# Patient Record
Sex: FEMALE | Race: WHITE | NOT HISPANIC OR LATINO | URBAN - METROPOLITAN AREA
[De-identification: names, ages, dates, MRNs, and addresses within clinical notes are randomized per-mention and may not be internally consistent; named-entity substitution may affect disease eponyms.]

---

## 2023-05-25 ENCOUNTER — EMERGENCY (EMERGENCY)
Facility: HOSPITAL | Age: 73
LOS: 1 days | Discharge: HOME | End: 2023-05-25
Attending: EMERGENCY MEDICINE | Admitting: EMERGENCY MEDICINE
Payer: MEDICARE

## 2023-05-25 VITALS
TEMPERATURE: 98 F | DIASTOLIC BLOOD PRESSURE: 56 MMHG | OXYGEN SATURATION: 94 % | RESPIRATION RATE: 16 BRPM | HEART RATE: 69 BPM | SYSTOLIC BLOOD PRESSURE: 105 MMHG

## 2023-05-25 PROCEDURE — 99284 EMERGENCY DEPT VISIT MOD MDM: CPT

## 2023-05-25 PROCEDURE — 71250 CT THORAX DX C-: CPT | Mod: 26,MH

## 2023-05-25 PROCEDURE — 71046 X-RAY EXAM CHEST 2 VIEWS: CPT | Mod: 26

## 2023-05-25 NOTE — ED PROVIDER NOTE - PHYSICAL EXAMINATION
Constitutional:  Well appearing, awake, alert, oriented to person, place, time/situation and in no apparent distress.  HEENT: Airway patent, Nasal mucosa clear. Mouth with normal mucosa.   Eyes: Clear bilaterally, pupils equal, round and reactive to light.  Cardiac: Normal rate, regular rhythm.  Respiratory: Breath sounds clear and equal bilaterally.  GI: Abdomen soft, non-tender, no guarding.  MSK: Spine appears normal, range of motion is not limited, no muscle or joint tenderness  Neuro: Alert and oriented, no focal deficits, no motor or sensory deficits.  Skin: Skin normal color for race, warm, dry and intact. No evidence of rash.

## 2023-05-25 NOTE — ED PROVIDER NOTE - PATIENT PORTAL LINK FT
You can access the FollowMyHealth Patient Portal offered by [facility] by registering at the following website: [email or web address]. By joining [facility]’s FollowMyHealth portal, you will also be able to view your health information using other applications (apps) compatible with our system.

## 2023-05-25 NOTE — ED ADULT NURSE NOTE - NSFALLUNIVINTERV_ED_ALL_ED
Bed/Stretcher in lowest position, wheels locked, appropriate side rails in place/Call bell, personal items and telephone in reach/Instruct patient to call for assistance before getting out of bed/chair/stretcher/Non-slip footwear applied when patient is off stretcher/[place] to call system/Physically safe environment - no spills, clutter or unnecessary equipment/Purposeful proactive rounding/Room/bathroom lighting operational, light cord in reach

## 2023-05-25 NOTE — ED PROVIDER NOTE - NSFOLLOWUPINSTRUCTIONS_ED_ALL_ED_FT
Pulmonary Nodule    A pulmonary nodule is a small, round growth of tissue in the lung. It is sometimes referred to as a shadow or a spot on the lung. Nodules can vary in size, and most measure less than ½ of an inch (10 mm). Nodules bigger than 1.2 inches (3 cm) are called lung masses. A pulmonary nodule is sometimes found during a routine chest X-ray or while other imaging tests are done to check for other problems.    Pulmonary nodules can be either noncancerous (benign) or cancerous (malignant). Most are noncancerous. Smaller nodules in people who do not smoke and who do not have any other risk factors for lung cancer are more likely to be noncancerous. Larger, irregular nodules in people who smoke or who have a strong family history of lung cancer are more likely to be cancerous.    What are the causes?  This condition may be caused by:  A bacterial, fungal, or viral infection, such as tuberculosis. The infection is usually an old and inactive one.  Cancerous tissue, such as lung cancer or a cancer in another part of the body that has spread to the lung.  A noncancerous mass of tissue.  Inflammation from conditions such as rheumatoid arthritis.  Abnormal blood vessels in the lungs.  What are the signs or symptoms?  Usually, there are no symptoms of this condition. If symptoms appear, they are usually related to the underlying cause. For example, if the condition is caused by an infection, you may have a cough or a fever.    How is this diagnosed?  This condition is usually diagnosed with an X-ray or CT scan. To help determine whether a pulmonary nodule is benign or malignant, your health care provider will:  Take your medical history.  Perform a physical exam.  Order tests. These may include:  Chest X-rays.  A CT scan. This test shows smaller pulmonary nodules more clearly and with more detail than an X-ray.  A positron emission tomography (PET) scan. This test is done to check if a nodule is cancerous. During the test, a small amount of a radioactive substance is injected into the bloodstream. Then a picture is taken.  Biopsy to evaluate for cancer or confirm a diagnosis. This procedure involves removing a tissue sample from the nodule by inserting a needle through the chest, placing a scope down into the lung, or doing open surgery.  A skin test called a tuberculin test. This test is done to check if you have been exposed to the germ that causes tuberculosis.  Blood tests.  How is this treated?  Treatment for this condition depends on whether the pulmonary nodule is malignant or benign. It also depends on your risk of getting cancer.    Noncancerous nodules usually do not need to be treated, but they may need to be monitored with CT scans. If a CT scan shows that the pulmonary nodule got bigger, more tests may be done.    Nodules that are found to be cancerous after a biopsy require treatment depending on the type and stage of the cancer. You will need more diagnostic tests, such as CT and PET scans, to determine the stage of the cancer. Treatments for cancer can include:  Surgery.  Radiation therapy.  Chemotherapy.  Immunotherapy.  Some nodules need to be removed. If you need a nodule removed, you may have a procedure called a thoracotomy. During the procedure, your health care provider will make an incision in your chest and remove the part of the lung where the nodule is located.    Follow these instructions at home:  Take over-the-counter and prescription medicines only as told by your health care provider.  Do not use any products that contain nicotine or tobacco. These products include cigarettes, chewing tobacco, and vaping devices, such as e-cigarettes. If you need help quitting, ask your health care provider.  Keep all follow-up visits. This is important.  Contact a health care provider if:  You have pain in your chest, back, or shoulder.  You are short of breath or have trouble breathing when you are active.  You develop a cough, or you develop hoarseness for an unexplained reason.  You feel sick or unusually tired.  You do not feel like eating or you lose weight without trying.  You develop chills or night sweats.  You need two or more pillows to sleep on at night.  You have any of these problems:  A fever and symptoms that suddenly get worse.  A fever or persistent symptoms for more than 2–3 days.  Get help right away if:  You cannot catch your breath.  You have sudden chest pain.  You start making high-pitched whistling sounds when you breathe, most often when you breathe out (you wheeze).  You cannot stop coughing, or you cough up blood or bloody mucus from your lungs (sputum).  You become dizzy or feel like you may faint.  These symptoms may represent a serious problem that is an emergency. Do not wait to see if the symptoms will go away. Get medical help right away. Call your local emergency services (911 in the [place] Do not drive yourself to the hospital.    Summary  A pulmonary nodule is a small, round growth of tissue in the lung. Most pulmonary nodules are noncancerous.  Common causes of pulmonary nodules include infection, inflammation, and noncancerous growths.  This condition is usually diagnosed with an X-ray or CT scan.  Treatment for this condition depends on whether the pulmonary nodule is malignant or benign. It also depends on your risk of getting cancer.  If a nodule is found to be cancerous, you will need specific diagnostic tests and treatment options as told by your health care provider.  This information is not intended to replace advice given to you by your health care provider. Make sure you discuss any questions you have with your health care provider.

## 2023-05-25 NOTE — ED PROVIDER NOTE - CLINICAL SUMMARY MEDICAL DECISION MAKING FREE TEXT BOX
A/P: Very pleasant 73-year old F w/ GERD presenting to E.D. for 3-4 months of cough  --Differential includes GERD, bronchospasm, prolonged URI, pneumonia  --Plan to obtain CXR

## 2023-05-25 NOTE — ED PROVIDER NOTE - OBJECTIVE STATEMENT
73-year-old female with a history of well-controlled GERD presenting to the emergency room with a cough.  Patient states that she has been experiencing it for the past 3 to 4 months.  States that it is dry, occurs mostly during the day.  States that she feels like that there is something stuck in her throat or upper chest at times.  Denies fevers, chills, sweats (other than hot flashes for years), weight loss, recent travel.  Has not taken any medication to alleviate cough because patient does not often take medication.

## 2023-05-25 NOTE — ED PROVIDER NOTE - PROGRESS NOTE DETAILS
Discussed findings with patient.  Recommended follow-up with her primary care doctor.  Recommended surveillance for pulmonary nodules, CT/MRI liver protocol.  Well-appearing, discharge.

## 2023-05-26 VITALS
DIASTOLIC BLOOD PRESSURE: 74 MMHG | RESPIRATION RATE: 16 BRPM | HEART RATE: 60 BPM | OXYGEN SATURATION: 98 % | SYSTOLIC BLOOD PRESSURE: 106 MMHG | TEMPERATURE: 98 F

## 2023-05-29 DIAGNOSIS — R05.9 COUGH, UNSPECIFIED: ICD-10-CM

## 2023-05-29 DIAGNOSIS — Z87.19 PERSONAL HISTORY OF OTHER DISEASES OF THE DIGESTIVE SYSTEM: ICD-10-CM
